# Patient Record
Sex: MALE | Race: WHITE | NOT HISPANIC OR LATINO | URBAN - METROPOLITAN AREA
[De-identification: names, ages, dates, MRNs, and addresses within clinical notes are randomized per-mention and may not be internally consistent; named-entity substitution may affect disease eponyms.]

---

## 2023-08-18 ENCOUNTER — OFFICE VISIT (OUTPATIENT)
Dept: PRIMARY CARE | Facility: CLINIC | Age: 53
End: 2023-08-18
Payer: COMMERCIAL

## 2023-08-18 VITALS
BODY MASS INDEX: 25.22 KG/M2 | HEIGHT: 77 IN | HEART RATE: 84 BPM | WEIGHT: 213.6 LBS | DIASTOLIC BLOOD PRESSURE: 76 MMHG | SYSTOLIC BLOOD PRESSURE: 134 MMHG | OXYGEN SATURATION: 96 %

## 2023-08-18 DIAGNOSIS — Z00.00 ROUTINE GENERAL MEDICAL EXAMINATION AT A HEALTH CARE FACILITY: ICD-10-CM

## 2023-08-18 DIAGNOSIS — D64.9 ANEMIA, UNSPECIFIED TYPE: ICD-10-CM

## 2023-08-18 DIAGNOSIS — F32.A ANXIETY AND DEPRESSION: ICD-10-CM

## 2023-08-18 DIAGNOSIS — E78.5 DYSLIPIDEMIA: ICD-10-CM

## 2023-08-18 DIAGNOSIS — R73.02 IGT (IMPAIRED GLUCOSE TOLERANCE): ICD-10-CM

## 2023-08-18 DIAGNOSIS — Z00.00 WELLNESS EXAMINATION: Primary | ICD-10-CM

## 2023-08-18 DIAGNOSIS — F41.9 ANXIETY AND DEPRESSION: ICD-10-CM

## 2023-08-18 DIAGNOSIS — E03.9 HYPOTHYROIDISM, UNSPECIFIED TYPE: ICD-10-CM

## 2023-08-18 PROCEDURE — G0446 INTENS BEHAVE THER CARDIO DX: HCPCS | Performed by: EMERGENCY MEDICINE

## 2023-08-18 PROCEDURE — G0444 DEPRESSION SCREEN ANNUAL: HCPCS | Performed by: EMERGENCY MEDICINE

## 2023-08-18 PROCEDURE — G0442 ANNUAL ALCOHOL SCREEN 15 MIN: HCPCS | Performed by: EMERGENCY MEDICINE

## 2023-08-18 PROCEDURE — 99396 PREV VISIT EST AGE 40-64: CPT | Performed by: EMERGENCY MEDICINE

## 2023-08-18 PROCEDURE — 99213 OFFICE O/P EST LOW 20 MIN: CPT | Performed by: EMERGENCY MEDICINE

## 2023-08-18 RX ORDER — FLUTICASONE PROPIONATE 50 MCG
SPRAY, SUSPENSION (ML) NASAL
COMMUNITY
Start: 2019-09-27

## 2023-08-18 NOTE — PROGRESS NOTES
Diagnoses/Problems  Health Maintenance/Risks    · Encounter for preventive health examination (V70.0) (Z00.00)  Assessed    · Occasional tobacco smoker (305.1) (Z72.0)   · Anxiety and depression (300.00,311) (F41.9,F32.A)   · Fluttering sensation of heart (785.1) (R00.2)     Orders  Anxiety and depression    · Start: busPIRone HCl - 10 MG Oral Tablet; TAKE 1 TABLET TWICE DAILY   Rx By: Arthur San; Dispense: 30 Days ; #:60 Tablet; Refill: 0; For: Anxiety and depression; AMANDA = N; Verified Transmission to [id number]; Last Updated By: System, [employer]; 5/25/2022 10:06:21 AM   · Start: Escitalopram Oxalate 10 MG Oral Tablet; TAKE 1 TABLET DAILY   Rx By: Arthur San; Dispense: 30 Days ; #:30 X 30 Tablet Bottle; Refill: 1; For: Anxiety and depression; AMANDA = N; Verified Transmission to [facility] #[id number]; Last Updated By: System, SureScripts; 5/25/2022 10:06:21 AM  SocHx: Occasional tobacco smoker    · Tobacco Use Screening; Status:Complete;   Done: 25May2022   Perform:Not Applicable;Ordered;  For:SocHx: Occasional tobacco smoker; Ordered By:Rosio De Jesus;     Provider Impressions     49-year-old for physical and office visit     Anxiety and depression-patient is clearly stressed.  Declined medications at this time.  Does go to a therapist    Wants to think about getting a stress test since somebody that he knows needed a bypass    Heart fluttering sensation-this was secondary to anxiety and has resolved     History of seasonal allergies-takes Flonase        PH Q-9 depression screening was completed by authorized employee of the practice and  explained the questionnaire and discussed the answers with the patient.    I screened for alcohol use    We had face-to-face discussion with this patient about the cardiovascular risk and behavioral therapies of nutritional choices, exercise and elimination of habits contradicting to the risk. We agreed on how they may be able to reduce their current cardiovascular  risk.       Preventive care - he had a flu shot at work but isn't clear when  Not due for pneumonia shot  Had a tetanus shot in 2018  Had a colonoscopy couple years ago since he is positive family history of colon cancer in early age  Colonoscopy was negative for except for 1 polyp     Lab work     Follow-up after in 3-6 months or as needed           Chief Complaint  Chief Complaints    · Well Adult Visit     physical      History of Present Illness49-year-old man here for physical and office visit     States that he is very stressed.   Lost his father to cancer and his wife had breast cancer and is recovering from that    Family history-Patient's family history is not contributory to their current health issues     Social history-  Smokes about 5 to 6 cigarettes a day  Drinks 1 beer with dinner and occasionally more  Denies any drugs except when he took some Gummies     Allergies-as per nursing documentation           Scores and Scales  PHQ-9     Goal [id number] 09:32AM   PHQ-9 #10. If you checked off any problems, how difficult have these problems made it for you to do your work, take care of things at home, or get along with other people?       PHQ-9 Total Score (Please update problem list based on total score)       PHQ-9 Depression Severity       PHQ-9 #1. Little interest or pleasure in doing things   0-Not at all   PHQ-9 #2. Feeling down, depressed, or hopelesS   1-Several days   PHQ-9 #3. Trouble falling or staying asleep, or sleeping too much   1-Several days   PHQ-9 #4. Feeling tired or having little energy   2-More than half the days   PHQ-9 #5. Poor appetite or overeating   0-Not at all   PHQ-9 #6. Feeling bad about yourself or you are a failure or that you have let yourself or your family down   1-Several days   PHQ-9 #7. Trouble concentrating on things, such as reading the newspaper or watch television   1-Several days   PHQ-9 #8. Moving or speaking so slowly that other people could have noticed. Or the  opposite-being so fidgety or restless that you have been moving around a lot more than usual   0-Not at all   PHQ-9 #9. Thoughts that you would be better off dead, or of hurting yourself   0-Not at all   Review of Systems  All eleven systems were reviewed with the patient and were negative for any symptoms other than what is documented in the history of present illness.        Active Problems  Problems    · Acute pharyngitis (462) (J02.9)   · Acute sinusitis (461.9) (J01.90)   · Fluttering sensation of heart (785.1) (R00.2)   · Low back strain (847.2) (S39.012A)   · Rectal bleeding (569.3) (K62.5)   · Right wrist injury (959.3) (S69.91XA)   · Seasonal allergies (477.9) (J30.2)   · Sore throat (462) (J02.9)     Past Medical History  Problems    · History of Atopy (V15.09) (Z88.9)   · History of seasonal allergies (V15.09) (Z88.9)     Family History  Father    · Family history of colon cancer (V16.0) (Z80.0)     Social History  Problems    · Currently working   · Daily alcohol use   · Daily caffeine consumption, 2-3 servings a day   ·    · Occasional tobacco smoker (305.1) (Z72.0)     Allergies  Medication    · No Known Drug Allergies   Recorded By: Ann Marie Balderrama; 9/4/2014 8:30:08 AM     Current Meds     Medication Name Instruction   Fluticasone Propionate 50 MCG/ACT Nasal Suspension INSTILL 2 SQUIRT Daily           Physical Exam  General appearance: Alert and in no acute distress  HEENT: Normal Inspection  Neck - Normal Inspectiopn  Respiratory : No respiratory distress.   Cardiovascular: heart rate normal. No gallop  Back - normal inspection  Skin inspection:Warm  Musculoskeletal : No deformities  Neuro : Grossly Intact  Psychiatric : Cooperative

## 2023-12-13 LAB
NON-UH HIE A/G RATIO: 1.8
NON-UH HIE ALB: 4.3 G/DL (ref 3.4–5)
NON-UH HIE ALK PHOS: 84 UNIT/L (ref 45–117)
NON-UH HIE BASO COUNT: 0.03 X1000 (ref 0–0.2)
NON-UH HIE BASOS %: 0.4 %
NON-UH HIE BILIRUBIN, TOTAL: 1.3 MG/DL (ref 0.3–1.2)
NON-UH HIE BUN/CREAT RATIO: 16
NON-UH HIE BUN: 16 MG/DL (ref 9–23)
NON-UH HIE CALCIUM: 9.6 MG/DL (ref 8.7–10.4)
NON-UH HIE CALCULATED LDL CHOLESTEROL: 99 MG/DL (ref 60–130)
NON-UH HIE CALCULATED OSMOLALITY: 282 MOSM/KG (ref 275–295)
NON-UH HIE CHLORIDE: 108 MMOL/L (ref 98–107)
NON-UH HIE CHOLESTEROL: 164 MG/DL (ref 100–200)
NON-UH HIE CO2, VENOUS: 29 MMOL/L (ref 20–31)
NON-UH HIE CREATININE: 1 MG/DL (ref 0.6–1.1)
NON-UH HIE DIFF?: NO
NON-UH HIE EOS COUNT: 0.1 X1000 (ref 0–0.5)
NON-UH HIE EOSIN %: 1.2 %
NON-UH HIE GFR AA: >60
NON-UH HIE GLOBULIN: 2.4 G/DL
NON-UH HIE GLOMERULAR FILTRATION RATE: >60 ML/MIN/1.73M?
NON-UH HIE GLUCOSE: 84 MG/DL (ref 74–106)
NON-UH HIE GOT: 20 UNIT/L (ref 15–37)
NON-UH HIE GPT: 31 UNIT/L (ref 10–49)
NON-UH HIE HCT: 48.1 % (ref 41–52)
NON-UH HIE HDL CHOLESTEROL: 35 MG/DL (ref 40–60)
NON-UH HIE HGB A1C: 5.2 %
NON-UH HIE HGB: 16.4 G/DL (ref 13.5–17.5)
NON-UH HIE INSTR WBC: 9
NON-UH HIE K: 4.5 MMOL/L (ref 3.5–5.1)
NON-UH HIE LYMPH %: 27.9 %
NON-UH HIE LYMPH COUNT: 2.52 X1000 (ref 1.2–4.8)
NON-UH HIE MCH: 30.5 PG (ref 27–34)
NON-UH HIE MCHC: 34.1 G/DL (ref 32–37)
NON-UH HIE MCV: 89.5 FL (ref 80–100)
NON-UH HIE MONO %: 9 %
NON-UH HIE MONO COUNT: 0.81 X1000 (ref 0.1–1)
NON-UH HIE MPV: 7.3 FL (ref 7.4–10.4)
NON-UH HIE NA: 141 MMOL/L (ref 135–145)
NON-UH HIE NEUTROPHIL %: 61.6 %
NON-UH HIE NEUTROPHIL COUNT (ANC): 5.56 X1000 (ref 1.4–8.8)
NON-UH HIE NUCLEATED RBC: 0 /100WBC
NON-UH HIE PLATELET: 236 X10 (ref 150–450)
NON-UH HIE RBC: 5.37 X10 (ref 4.7–6.1)
NON-UH HIE RDW: 13.6 % (ref 11.5–14.5)
NON-UH HIE TOTAL CHOL/HDL CHOL RATIO: 4.7
NON-UH HIE TOTAL PROTEIN: 6.7 G/DL (ref 5.7–8.2)
NON-UH HIE TRIGLYCERIDES: 149 MG/DL (ref 30–150)
NON-UH HIE TSH: 1.51 UIU/ML (ref 0.55–4.78)
NON-UH HIE WBC: 9 X10 (ref 4.5–11)

## 2023-12-20 ENCOUNTER — OFFICE VISIT (OUTPATIENT)
Dept: PRIMARY CARE | Facility: CLINIC | Age: 53
End: 2023-12-20
Payer: COMMERCIAL

## 2023-12-20 VITALS
SYSTOLIC BLOOD PRESSURE: 138 MMHG | DIASTOLIC BLOOD PRESSURE: 72 MMHG | WEIGHT: 217.4 LBS | BODY MASS INDEX: 25.67 KG/M2 | HEIGHT: 77 IN

## 2023-12-20 DIAGNOSIS — F41.9 ANXIETY AND DEPRESSION: Primary | ICD-10-CM

## 2023-12-20 DIAGNOSIS — F32.A ANXIETY AND DEPRESSION: Primary | ICD-10-CM

## 2023-12-20 PROCEDURE — 99213 OFFICE O/P EST LOW 20 MIN: CPT | Performed by: EMERGENCY MEDICINE

## 2023-12-20 RX ORDER — SERTRALINE HYDROCHLORIDE 50 MG/1
50 TABLET, FILM COATED ORAL DAILY
Qty: 30 TABLET | Refills: 2 | Status: SHIPPED | OUTPATIENT
Start: 2023-12-20 | End: 2024-01-31 | Stop reason: SDUPTHER

## 2023-12-20 NOTE — PROGRESS NOTES
Subjective   Patient ID: Tino Yu is a 53 y.o. male who presents for Results.    Assessment/Plan   Problem List Items Addressed This Visit    None  Visit Diagnoses       Anxiety and depression    -  Primary    Relevant Medications    sertraline (Zoloft) 50 mg tablet          Anxiety and depression- Will start on zoloft. Counseled on 25mg dose for 2 weeks followed by increase to 50mg. Buspar prn also sent.  Does go to a therapist     Heart fluttering sensation-this was secondary to anxiety and has resolved     History of seasonal allergies-takes Flonase      Preventive care - he had a flu shot at work but isn't clear when  Not due for pneumonia shot  Had a tetanus shot in 2018  Had a colonoscopy couple years ago since he is positive family history of colon cancer in early age  Colonoscopy was negative for except for 1 polyp     Lab work reviewed and nonremarkable      Follow-up after in 3-6 months or as needed    Source of history: Nurse, Medical personnel, Medical record, Patient.  History limitation: None.    HPI  53 y.o. male here for follow up visit     Presents today to review results or recent lab work. All within normal limits.     Patient is very stressed. Lost his father to cancer and his wife had breast cancer and is recovering.   He previously was trying to manage without medication but now is interested. Feeling very irritable and parr.     Family history-Patient's family history is not contributory to their current health issues     Social history-  Smokes about 5 to 6 cigarettes a day  Drinks 1 beer with dinner and occasionally more  Denies any drugs except when he took some Gummies    No Known Allergies    Current Outpatient Medications   Medication Sig Dispense Refill    fluticasone (Flonase) 50 mcg/actuation nasal spray Administer into affected nostril(s) once daily.      sertraline (Zoloft) 50 mg tablet Take 1 tablet (50 mg) by mouth once daily. Take half pill daily for 2 weeks followed  "by 1 pill daily 30 tablet 2     No current facility-administered medications for this visit.       Objective   Visit Vitals  /72   Ht 1.956 m (6' 5\")   Wt 98.6 kg (217 lb 6.4 oz)   BMI 25.78 kg/m²   Smoking Status Some Days   BSA 2.31 m²     Physical Exam  Vital signs as per nursing/MA documentation   General appearance: Alert and in no acute distress  HEENT: Normal Inspection   Neck: Normal Inspection   Respiratory: No respiratory distress Lungs are clear   Cardiovascular: Heart rate normal. No gallop  Back: Normal Inspection   Skin inspection: Warm   Musculoskeletal: No deformities   Neuro: Limited exam. Baseline    Review of Systems  Comprehensive review of systems as allowed by patient condition and nursing input is negative    No visits with results within 4 Month(s) from this visit.   Latest known visit with results is:   Legacy Encounter on 05/25/2022   Component Date Value Ref Range Status    Hemoglobin A1C 05/25/2022 5.0  % Final    Estimated Average Glucose 05/25/2022 97  MG/DL Final    WBC 05/25/2022 7.0  4.4 - 11.3 x10E9/L Final    nRBC 05/25/2022 0.0  0.0 - 0.0 /100 WBC Final    RBC 05/25/2022 5.31  4.50 - 5.90 x10E12/L Final    Hemoglobin 05/25/2022 16.4  13.5 - 17.5 g/dL Final    Hematocrit 05/25/2022 49.2  41.0 - 52.0 % Final    MCV 05/25/2022 93  80 - 100 fL Final    MCHC 05/25/2022 33.3  32.0 - 36.0 g/dL Final    Platelets 05/25/2022 218  150 - 450 x10E9/L Final    RDW 05/25/2022 13.8  11.5 - 14.5 % Final    Neutrophils % 05/25/2022 59.3  40.0 - 80.0 % Final    Immature Granulocytes %, Automated 05/25/2022 1.0 (H)  0.0 - 0.9 % Final    Lymphocytes % 05/25/2022 28.5  13.0 - 44.0 % Final    Monocytes % 05/25/2022 9.9  2.0 - 10.0 % Final    Eosinophils % 05/25/2022 0.9  0.0 - 6.0 % Final    Basophils % 05/25/2022 0.4  0.0 - 2.0 % Final    Neutrophils Absolute 05/25/2022 4.15  1.20 - 7.70 x10E9/L Final    Lymphocytes Absolute 05/25/2022 1.99  1.20 - 4.80 x10E9/L Final    Monocytes Absolute " 05/25/2022 0.69  0.10 - 1.00 x10E9/L Final    Eosinophils Absolute 05/25/2022 0.06  0.00 - 0.70 x10E9/L Final    Basophils Absolute 05/25/2022 0.03  0.00 - 0.10 x10E9/L Final    Cholesterol 05/25/2022 169  0 - 199 mg/dL Final    HDL 05/25/2022 38.3 (A)  mg/dL Final    Cholesterol/HDL Ratio 05/25/2022 4.4   Final    LDL 05/25/2022 100 (H)  0 - 99 mg/dL Final    VLDL 05/25/2022 31  0 - 40 mg/dL Final    Triglycerides 05/25/2022 153 (H)  0 - 149 mg/dL Final    TSH 05/25/2022 1.00  0.44 - 3.98 mIU/L Final    Glucose 05/25/2022 90  74 - 99 mg/dL Final    Sodium 05/25/2022 140  136 - 145 mmol/L Final    Potassium 05/25/2022 4.2  3.5 - 5.3 mmol/L Final    Chloride 05/25/2022 105  98 - 107 mmol/L Final    Bicarbonate 05/25/2022 26  21 - 32 mmol/L Final    Anion Gap 05/25/2022 13  10 - 20 mmol/L Final    Urea Nitrogen 05/25/2022 13  6 - 23 mg/dL Final    Creatinine 05/25/2022 0.99  0.50 - 1.30 mg/dL Final    GFR MALE 05/25/2022 >90  >90 mL/min/1.73m2 Final    Calcium 05/25/2022 9.6  8.6 - 10.6 mg/dL Final    Albumin 05/25/2022 4.8  3.4 - 5.0 g/dL Final    Alkaline Phosphatase 05/25/2022 81  33 - 120 U/L Final    Total Protein 05/25/2022 6.7  6.4 - 8.2 g/dL Final    AST 05/25/2022 19  9 - 39 U/L Final    Total Bilirubin 05/25/2022 1.3 (H)  0.0 - 1.2 mg/dL Final    ALT (SGPT) 05/25/2022 27  10 - 52 U/L Final       Radiology: Reviewed imaging in powerchart.  No results found.    No family history on file.  Social History     Socioeconomic History    Marital status:      Spouse name: None    Number of children: None    Years of education: None    Highest education level: None   Occupational History    None   Tobacco Use    Smoking status: Some Days     Types: Cigarettes    Smokeless tobacco: Never   Substance and Sexual Activity    Alcohol use: Yes    Drug use: None    Sexual activity: None   Other Topics Concern    None   Social History Narrative    None     Social Determinants of Health     Financial Resource Strain:  Not on file   Food Insecurity: Not on file   Transportation Needs: Not on file   Physical Activity: Not on file   Stress: Not on file   Social Connections: Not on file   Intimate Partner Violence: Not on file   Housing Stability: Not on file     Past Medical History:   Diagnosis Date    Allergy status to unspecified drugs, medicaments and biological substances     History of seasonal allergies    Allergy status to unspecified drugs, medicaments and biological substances     Atopy     History reviewed. No pertinent surgical history.    Scribe Attestation  By signing my name below, ISheila Scribe   attest that this documentation has been prepared under the direction and in the presence of Arthur San MD.

## 2024-01-03 ENCOUNTER — APPOINTMENT (OUTPATIENT)
Dept: UROLOGY | Facility: CLINIC | Age: 54
End: 2024-01-03
Payer: COMMERCIAL

## 2024-01-18 ENCOUNTER — TELEPHONE (OUTPATIENT)
Dept: PRIMARY CARE | Facility: CLINIC | Age: 54
End: 2024-01-18
Payer: COMMERCIAL

## 2024-01-18 DIAGNOSIS — F41.9 ANXIETY AND DEPRESSION: ICD-10-CM

## 2024-01-18 DIAGNOSIS — F32.A ANXIETY AND DEPRESSION: ICD-10-CM

## 2024-01-18 RX ORDER — SERTRALINE HYDROCHLORIDE 50 MG/1
50 TABLET, FILM COATED ORAL DAILY
Qty: 90 TABLET | Refills: 1 | Status: CANCELLED | OUTPATIENT
Start: 2024-01-18

## 2024-01-31 RX ORDER — SERTRALINE HYDROCHLORIDE 50 MG/1
50 TABLET, FILM COATED ORAL DAILY
Qty: 90 TABLET | Refills: 1 | Status: SHIPPED | OUTPATIENT
Start: 2024-01-31

## 2024-01-31 NOTE — TELEPHONE ENCOUNTER
PT is calling because he states that [facility] will not fill his prescription for 30 days and they require it to be 90 days.  He only has 8 pills before he needs a refill.    sertraline (Zoloft) 50 mg tablet     Also, he would like a call back regarding this.  He said he left a message a few weeks ago.

## 2024-04-10 ENCOUNTER — TELEPHONE (OUTPATIENT)
Dept: PRIMARY CARE | Facility: CLINIC | Age: 54
End: 2024-04-10
Payer: COMMERCIAL

## 2024-04-10 DIAGNOSIS — F41.9 ANXIETY: ICD-10-CM

## 2024-04-10 DIAGNOSIS — F41.9 ANXIETY AND DEPRESSION: ICD-10-CM

## 2024-04-10 DIAGNOSIS — F32.A ANXIETY AND DEPRESSION: ICD-10-CM

## 2024-04-10 NOTE — TELEPHONE ENCOUNTER
Pt called saying that you were going to change his sertraline to buspar but I don't see any notes about this. Wants to know if you can prescribe buspar and send it over to the pharmacy for him

## 2024-04-11 RX ORDER — BUSPIRONE HYDROCHLORIDE 10 MG/1
10 TABLET ORAL 3 TIMES DAILY PRN
Qty: 30 TABLET | Refills: 1 | Status: SHIPPED | OUTPATIENT
Start: 2024-04-11

## 2024-07-24 DIAGNOSIS — F32.A ANXIETY AND DEPRESSION: ICD-10-CM

## 2024-07-24 DIAGNOSIS — F41.9 ANXIETY AND DEPRESSION: ICD-10-CM

## 2024-07-26 RX ORDER — SERTRALINE HYDROCHLORIDE 50 MG/1
50 TABLET, FILM COATED ORAL DAILY
Qty: 30 TABLET | Refills: 0 | Status: SHIPPED | OUTPATIENT
Start: 2024-07-26

## 2024-07-31 DIAGNOSIS — F41.9 ANXIETY AND DEPRESSION: ICD-10-CM

## 2024-07-31 DIAGNOSIS — F32.A ANXIETY AND DEPRESSION: ICD-10-CM

## 2024-07-31 RX ORDER — SERTRALINE HYDROCHLORIDE 50 MG/1
50 TABLET, FILM COATED ORAL DAILY
Qty: 90 TABLET | Refills: 3 | Status: SHIPPED | OUTPATIENT
Start: 2024-07-31

## 2024-07-31 RX ORDER — SERTRALINE HYDROCHLORIDE 50 MG/1
50 TABLET, FILM COATED ORAL DAILY
Qty: 90 TABLET | Refills: 1 | Status: SHIPPED | OUTPATIENT
Start: 2024-07-31 | End: 2024-07-31

## 2025-05-15 ASSESSMENT — PROMIS GLOBAL HEALTH SCALE
RATE_PHYSICAL_HEALTH: GOOD
RATE_MENTAL_HEALTH: GOOD
CARRYOUT_PHYSICAL_ACTIVITIES: MOSTLY
EMOTIONAL_PROBLEMS: SOMETIMES
RATE_AVERAGE_PAIN: 2
RATE_QUALITY_OF_LIFE: GOOD
RATE_GENERAL_HEALTH: GOOD
CARRYOUT_SOCIAL_ACTIVITIES: GOOD
RATE_SOCIAL_SATISFACTION: GOOD
RATE_AVERAGE_FATIGUE: MILD

## 2025-05-16 ENCOUNTER — APPOINTMENT (OUTPATIENT)
Dept: PRIMARY CARE | Facility: CLINIC | Age: 55
End: 2025-05-16
Payer: COMMERCIAL

## 2025-05-16 VITALS
HEIGHT: 77 IN | SYSTOLIC BLOOD PRESSURE: 120 MMHG | WEIGHT: 227 LBS | HEART RATE: 77 BPM | DIASTOLIC BLOOD PRESSURE: 73 MMHG | OXYGEN SATURATION: 96 % | BODY MASS INDEX: 26.8 KG/M2

## 2025-05-16 DIAGNOSIS — H66.90 EAR INFECTION: Primary | ICD-10-CM

## 2025-05-16 RX ORDER — IBUPROFEN 100 MG/1
TABLET, CHEWABLE ORAL EVERY 8 HOURS PRN
COMMUNITY

## 2025-05-16 RX ORDER — NEOMYCIN SULFATE, POLYMYXIN B SULFATE, HYDROCORTISONE 3.5; 10000; 1 MG/ML; [USP'U]/ML; MG/ML
2 SOLUTION/ DROPS AURICULAR (OTIC) 4 TIMES DAILY
Qty: 10 ML | Refills: 0 | Status: SHIPPED | OUTPATIENT
Start: 2025-05-16 | End: 2025-05-23

## 2025-05-16 ASSESSMENT — PATIENT HEALTH QUESTIONNAIRE - PHQ9
1. LITTLE INTEREST OR PLEASURE IN DOING THINGS: NOT AT ALL
2. FEELING DOWN, DEPRESSED OR HOPELESS: NOT AT ALL
SUM OF ALL RESPONSES TO PHQ9 QUESTIONS 1 AND 2: 0

## 2025-05-16 NOTE — PROGRESS NOTES
Subjective   Patient ID: Tino Yu is a 54 y.o. male who presents for Annual Exam.    Assessment/Plan   Problem List Items Addressed This Visit    None    Fluttering and clicking in right ear-eardrops and referral to ENT    Anxiety and depression-doing much better.  Continue SSRI     heart fluttering sensation-this was secondary to anxiety and has resolved     History of seasonal allergies-takes Flonase      Preventive care - he had a flu shot at work but isn't clear when  Not due for pneumonia shot  Had a tetanus shot in 2018  Had a colonoscopy couple years ago since he is positive family history of colon cancer in early age  Colonoscopy was negative for except for 1 polyp       PH Q-9 depression screening was completed by authorized employee of the practice for 5-10 minutes and  explained the questionnaire and discussed the answers with the patient.    Alcohol screening was completed for 5 to 10 minutes    Follow-up after in 3-6 months or as needed    Source of history: Nurse, Medical personnel, Medical record, Patient.  History limitation: None.      54 y.o. male here for physical and physical and follow up visit     Doing much better in terms of his stress    Feels some clicking in his ER and thinks that his hearing may be reduced a little    Family history-Patient's family history is not contributory to their current health issues     Social history-  Smokes about 5 to 6 cigarettes a day  Drinks 1 beer with dinner and occasionally more  Denies any drugs except when he took some Gummies    No Known Allergies    Current Outpatient Medications   Medication Sig Dispense Refill    fluticasone (Flonase) 50 mcg/actuation nasal spray Administer into affected nostril(s) once daily.      ibuprofen 100 mg chewable tablet Chew every 8 hours if needed for mild pain (1 - 3).      sertraline (Zoloft) 50 mg tablet Take 1 tablet (50 mg) by mouth once daily. 90 tablet 3    busPIRone (Buspar) 10 mg tablet Take 1 tablet  "(10 mg) by mouth 3 times a day as needed (anxiety). 30 tablet 1     No current facility-administered medications for this visit.       Objective   Visit Vitals  /73   Pulse 77   Ht 1.956 m (6' 5\")   Wt 103 kg (227 lb)   SpO2 96%   BMI 26.92 kg/m²   Smoking Status Former   BSA 2.37 m²     Physical Exam  Vital signs as per nursing/MA documentation   General appearance: Alert and in no acute distress  HEENT: Normal Inspection   Neck: Normal Inspection   Respiratory: No respiratory distress Lungs are clear   Cardiovascular: Heart rate normal. No gallop  Back: Normal Inspection   Skin inspection: Warm   Musculoskeletal: No deformities   Neuro: Limited exam. Baseline    Review of Systems  Comprehensive review of systems as allowed by patient condition and nursing input is negative    No visits with results within 4 Month(s) from this visit.   Latest known visit with results is:   Orders Only on 12/13/2023   Component Date Value Ref Range Status    [facility] MPV 12/13/2023 7.3 (L)  7.4 - 10.4 fL Final    [facility] HGB 12/13/2023 16.4  13.5 - 17.5 g/dL Final    [facility] WBC 12/13/2023 9.0  4.5 - 11.0 x10 Final    [facility] RDW 12/13/2023 13.6  11.5 - 14.5 % Final    [facility] MCH 12/13/2023 30.5  27.0 - 34.0 pg Final    [facility] Nucleated RBC 12/13/2023 0  /100WBC Final    [facility] HCT 12/13/2023 48.1  41.0 - 52.0 % Final    [facility] RBC 12/13/2023 5.37  4.70 - 6.10 x10 Final    [facility] Platelet 12/13/2023 236  150 - 450 x10 Final    [facility] Instr WBC 12/13/2023 9.0   Final    [facility] MCHC 12/13/2023 34.1  32.0 - 37.0 g/dL Final    [facility] MCV 12/13/2023 89.5  80.0 - 100.0 fL Final    [facility] DIFF? 12/13/2023 No   Final    [facility] Neutrophil Count (ANC) 12/13/2023 5.56  1.40 - 8.80 x1000 Final    [facility] Eosin % 12/13/2023 1.2  % Final    [facility] Eos Count 12/13/2023 0.10  0.00 - 0.50 x1000 Final    [facility] Lymph % 12/13/2023 27.9  % Final    [facility] Lymph Count 12/13/2023 " 2.52  1.20 - 4.80 x1000 Final    [facility] Basos % 12/13/2023 0.4  % Final    [facility] Baso Count 12/13/2023 0.03  0.00 - 0.20 x1000 Final    [facility] Mono Count 12/13/2023 0.81  0.10 - 1.00 x1000 Final    [facility] Mono % 12/13/2023 9.0  % Final    [facility] Neutrophil % 12/13/2023 61.6  % Final    [facility] HGB A1C 12/13/2023 5.2  % Final    [facility] Na 12/13/2023 141  135 - 145 mmol/L Final    [facility] Total Protein 12/13/2023 6.7  5.7 - 8.2 g/dL Final    [facility] Creatinine 12/13/2023 1.0  0.6 - 1.1 mg/dL Final    [facility] Bilirubin, Total 12/13/2023 1.30 (H)  0.30 - 1.20 mg/dL Final    [facility] Alk Phos 12/13/2023 84  45 - 117 unit/L Final    [facility] GOT 12/13/2023 20  15 - 37 unit/L Final    [facility] CO2, venous 12/13/2023 29.0  20.0 - 31.0 mmol/L Final    [facility] A/G Ratio 12/13/2023 1.8   Final    [facility] K 12/13/2023 4.5  3.5 - 5.1 mmol/L Final    [facility] Calcium 12/13/2023 9.6  8.7 - 10.4 mg/dL Final    [facility] Globulin 12/13/2023 2.4  g/dL Final    [facility] BUN 12/13/2023 16  9 - 23 mg/dL Final    [facility] GFR AA 12/13/2023 >60   Final    [facility] Calculated Osmolality 12/13/2023 282  275 - 295 mOsm/kg Final    [facility] GPT 12/13/2023 31  10 - 49 unit/L Final    [facility] ALB 12/13/2023 4.3  3.4 - 5.0 g/dL Final    [facility] Glucose 12/13/2023 84  74 - 106 mg/dL Final    [facility] Glomerular Filtration R* 12/13/2023 >60  mL/min/1.73m? Final    [facility] Chloride 12/13/2023 108 (H)  98 - 107 mmol/L Final    [facility] BUN/Creat Ratio 12/13/2023 16.0   Final    [facility] Total Chol/HDL Chol Rat* 12/13/2023 4.7   Final    [facility] Triglycerides 12/13/2023 149  30 - 150 mg/dL Final    [facility] Cholesterol 12/13/2023 164  100 - 200 mg/dL Final    [facility] Calculated LDL Choleste* 12/13/2023 99  60 - 130 mg/dL Final    [facility] HDL Cholesterol 12/13/2023 35 (L)  40 - 60 mg/dL Final    [facility] TSH 12/13/2023 1.51  0.55 - 4.78 uIU/ml Final        Radiology: Reviewed imaging in powerchart.  No results found.    No family history on file.  Social History     Socioeconomic History    Marital status:    Tobacco Use    Smoking status: Former     Types: Cigarettes    Smokeless tobacco: Never   Substance and Sexual Activity    Alcohol use: Yes    Drug use: Not Currently     Past Medical History:   Diagnosis Date    Allergy status to unspecified drugs, medicaments and biological substances     History of seasonal allergies    Allergy status to unspecified drugs, medicaments and biological substances     Atopy     No past surgical history on file.    Scribe Attestation  By signing my name below, I, Arthur San MD , Scribe   attest that this documentation has been prepared under the direction and in the presence of Arthur San MD.

## 2025-07-22 LAB
ALBUMIN SERPL-MCNC: 5.1 G/DL (ref 3.6–5.1)
ALBUMIN/GLOB SERPL: 2.6 (CALC) (ref 1–2.5)
ALP SERPL-CCNC: 76 U/L (ref 35–144)
ALT SERPL-CCNC: 21 U/L (ref 9–46)
AST SERPL-CCNC: 18 U/L (ref 10–35)
BASOPHILS # BLD AUTO: 29 CELLS/UL (ref 0–200)
BASOPHILS NFR BLD AUTO: 0.4 %
BILIRUB SERPL-MCNC: 1 MG/DL (ref 0.2–1.2)
BUN SERPL-MCNC: 13 MG/DL (ref 7–25)
BUN/CREAT SERPL: ABNORMAL (CALC) (ref 6–22)
CALCIUM SERPL-MCNC: 9.6 MG/DL (ref 8.6–10.3)
CHLORIDE SERPL-SCNC: 105 MMOL/L (ref 98–110)
CHOLEST SERPL-MCNC: 164 MG/DL
CHOLEST/HDLC SERPL: 3.8 (CALC)
CO2 SERPL-SCNC: 24 MMOL/L (ref 20–32)
CREAT SERPL-MCNC: 0.9 MG/DL (ref 0.7–1.3)
EGFRCR SERPLBLD CKD-EPI 2021: 103 ML/MIN/1.73M2
EOSINOPHIL # BLD AUTO: 37 CELLS/UL (ref 15–500)
EOSINOPHIL NFR BLD AUTO: 0.5 %
ERYTHROCYTE [DISTWIDTH] IN BLOOD BY AUTOMATED COUNT: 13.7 % (ref 11–15)
GLOBULIN SER CALC-MCNC: 2 G/DL (CALC) (ref 1.9–3.7)
GLUCOSE SERPL-MCNC: 88 MG/DL (ref 65–99)
HBA1C MFR BLD: 5.3 %
HCT VFR BLD AUTO: 48 % (ref 38.5–50)
HDLC SERPL-MCNC: 43 MG/DL
HGB BLD-MCNC: 16.1 G/DL (ref 13.2–17.1)
LDLC SERPL CALC-MCNC: 97 MG/DL (CALC)
LYMPHOCYTES # BLD AUTO: 1737 CELLS/UL (ref 850–3900)
LYMPHOCYTES NFR BLD AUTO: 23.8 %
MCH RBC QN AUTO: 30.6 PG (ref 27–33)
MCHC RBC AUTO-ENTMCNC: 33.5 G/DL (ref 32–36)
MCV RBC AUTO: 91.3 FL (ref 80–100)
MONOCYTES # BLD AUTO: 562 CELLS/UL (ref 200–950)
MONOCYTES NFR BLD AUTO: 7.7 %
NEUTROPHILS # BLD AUTO: 4935 CELLS/UL (ref 1500–7800)
NEUTROPHILS NFR BLD AUTO: 67.6 %
NONHDLC SERPL-MCNC: 121 MG/DL (CALC)
PLATELET # BLD AUTO: 210 THOUSAND/UL (ref 140–400)
PMV BLD REES-ECKER: 9.4 FL (ref 7.5–12.5)
POTASSIUM SERPL-SCNC: 3.7 MMOL/L (ref 3.5–5.3)
PROT SERPL-MCNC: 7.1 G/DL (ref 6.1–8.1)
PSA SERPL-MCNC: 0.67 NG/ML
RBC # BLD AUTO: 5.26 MILLION/UL (ref 4.2–5.8)
SODIUM SERPL-SCNC: 141 MMOL/L (ref 135–146)
TRIGL SERPL-MCNC: 147 MG/DL
TSH SERPL-ACNC: 1.06 MIU/L (ref 0.4–4.5)
WBC # BLD AUTO: 7.3 THOUSAND/UL (ref 3.8–10.8)

## 2025-08-20 ENCOUNTER — TELEPHONE (OUTPATIENT)
Dept: PRIMARY CARE | Facility: CLINIC | Age: 55
End: 2025-08-20
Payer: COMMERCIAL

## 2025-08-21 ENCOUNTER — TELEPHONE (OUTPATIENT)
Dept: PRIMARY CARE | Facility: CLINIC | Age: 55
End: 2025-08-21
Payer: COMMERCIAL